# Patient Record
Sex: MALE | Race: WHITE | URBAN - METROPOLITAN AREA
[De-identification: names, ages, dates, MRNs, and addresses within clinical notes are randomized per-mention and may not be internally consistent; named-entity substitution may affect disease eponyms.]

---

## 2020-09-17 ENCOUNTER — EMERGENCY (EMERGENCY)
Facility: HOSPITAL | Age: 30
LOS: 1 days | Discharge: HOME | End: 2020-09-17
Attending: EMERGENCY MEDICINE | Admitting: EMERGENCY MEDICINE
Payer: COMMERCIAL

## 2020-09-17 VITALS
WEIGHT: 195.11 LBS | HEART RATE: 113 BPM | DIASTOLIC BLOOD PRESSURE: 85 MMHG | TEMPERATURE: 100 F | OXYGEN SATURATION: 97 % | SYSTOLIC BLOOD PRESSURE: 135 MMHG | RESPIRATION RATE: 20 BRPM | HEIGHT: 73 IN

## 2020-09-17 DIAGNOSIS — L03.114 CELLULITIS OF LEFT UPPER LIMB: ICD-10-CM

## 2020-09-17 DIAGNOSIS — M79.632 PAIN IN LEFT FOREARM: ICD-10-CM

## 2020-09-17 PROCEDURE — 73201 CT UPPER EXTREMITY W/DYE: CPT | Mod: 26,LT

## 2020-09-17 PROCEDURE — 73070 X-RAY EXAM OF ELBOW: CPT | Mod: 26,LT

## 2020-09-17 PROCEDURE — 93010 ELECTROCARDIOGRAM REPORT: CPT

## 2020-09-17 PROCEDURE — 73090 X-RAY EXAM OF FOREARM: CPT | Mod: 26,LT

## 2020-09-17 PROCEDURE — 99285 EMERGENCY DEPT VISIT HI MDM: CPT

## 2020-09-17 NOTE — ED ADULT NURSE NOTE - OBJECTIVE STATEMENT
Pt walked in c/o swelling and pain to L upper elbow as per noted a scab a few days ago and picked at it this AM noted the swelling and redness, went to UC , sent to ED for eval. Swelling and redness noted to site with small scab in the middle, temp at triage - sepsis protocol initated, in no acute distress, 18IVg to RAC , blood drawn and sent to lab. colt sood

## 2020-09-17 NOTE — ED PROVIDER NOTE - ATTENDING CONTRIBUTION TO CARE
Patient presenting with L arm cellulitis- may have started with an insect bite that he scratched. No hx of other SSTIs. No f/c. VSS. l ulnar side forearm quite swollen, red and indurated. No pain with ROM of muscles or elbow. CT shows just cellulitis. Got IV CTX and vanco. Did well d/c on PO abx with arm elevation and fu here.

## 2020-09-17 NOTE — ED PROVIDER NOTE - PATIENT PORTAL LINK FT
You can access the FollowMyHealth Patient Portal offered by [facility] by registering at the following website: [email or web address]. By joining [employer]’s FollowMyHealth portal, you will also be able to view your health information using other applications (apps) compatible with our system.

## 2020-09-17 NOTE — ED PROVIDER NOTE - PROGRESS NOTE DETAILS
PGY3/MD David. CT with no abscess or gas collection. Normal WBC and lactate. VCM and CTRX were given 2hrs ago, the skin erythematous got better, shrink, pt denies any fever, chills. APx4, pt understands the plan. any of the worsening signs or concerning, pt needs to come back to the ED tomorrow in the morning, but if any question, pt can call back to the ED. Dr. Paz's buisiness card has been provided. I have given the pt strict return and follow up precautions. The patient has been provided with a copy of all pertinent results. The patient has been informed of all concerning signs and symptoms to return to Emergency Department, the necessity to follow up with PMD/Clinic/follow up provided within 2-3 days was explained, and the patient reports understanding of above with capacity and insight.

## 2020-09-17 NOTE — ED PROVIDER NOTE - NSFOLLOWUPINSTRUCTIONS_ED_ALL_ED_FT
Cellulitis    Cellulitis is a skin infection caused by bacteria. This condition occurs most often in the arms and lower legs but can occur anywhere over the body. Symptoms include redness, swelling, warm skin, tenderness, and chills/fever. If you were prescribed an antibiotic medicine, take it as told by your health care provider. Do not stop taking the antibiotic even if you start to feel better.    SEEK IMMEDIATE MEDICAL CARE IF YOU HAVE ANY OF THE FOLLOWING SYMPTOMS: worsening fever, red streaks coming from affected area, vomiting or diarrhea, or dizziness/lightheadedness.    Please take Bactrim 2 T twice/day

## 2020-09-17 NOTE — ED ADULT NURSE NOTE - CHIEF COMPLAINT QUOTE
[facility], swelling and pain to left forearm, noted a few days ago. unknown cause. pt appears anxious and trembling in triage. states he takes adderall and zoloft.

## 2020-09-17 NOTE — ED PROVIDER NOTE - CLINICAL SUMMARY MEDICAL DECISION MAKING FREE TEXT BOX
PGY3/MD David. 29 yo M with no immuno compromise, p/w left arm redness, likely cellulitis not flucutence or pus drainarge, no limited ROM so not likely septic joint, but will get xray to r/o gas collction, given empirical evidence, likely MRSA or staphylo coccal infection, tachycardia, if pt fits sepsis criteria, iv abx will be given immediately but otherwise, trustable follow up, can be treated with oral abx with close PCP follow up. PGY3/MD David. 31 yo M with no immuno compromise, p/w left arm redness, likely cellulitis given no fluctuance or pus drainarge, but will do ct to r/o subcutaneous or intramuscular fluid collection, given empirical evidence, can be MRSA will give iv VCM, tachycardia, but not febrile or not toxic, not a septic appearance, with the result of CT, dispo accordingly

## 2020-09-17 NOTE — ED PROVIDER NOTE - PHYSICAL EXAMINATION
PGY3/MD David.   VITALS: reviewed  GEN: No apparent distress, A & O x 4  HEAD/EYES: NC/AT, anicteric sclerae, no conjunctival pallor  ENT: mucus membranes moist, oropharynx WNL, trachea midline, no JVD, neck is supple  RESP: lungs CTA with equal breath sounds bilaterally, chest wall nontender and atraumatic  CV: tachycardia; distal pulses intact and symmetric bilaterally  ABDOMEN: normoactive bowel sounds, soft, nondistended, nontender  MSK: extremities atraumatic and nontender, no edema, no asymmetry.   SKIN: warm, dry, no rash, no bruising, no cyanosis. color appropriate for ethnicity + 10 cm redness with tenderness over the lateral fore arm, close to joint but no limited ROM of the left lebow flexion, no effusion or flucutuence is appreciated, skin break on the top  NEURO: alert, mentating appropriately, no facial asymmetry. gross sensation, motor, coordination are intact  PSYCH: Affect appropriate

## 2020-09-17 NOTE — ED PROVIDER NOTE - OBJECTIVE STATEMENT
PGY3/MD David. 29 yo M with anxiety, p/w left fore arm redness and pain, sent from [facility] for cellulitis. Pt noted it started last Sunday (5d ago), minimum pain but progressively worsening, now 8/10. Did not take any medications, denies fever but feels chills. Denies trauma or long travel or hiking, noted that he might have "pinpul" at the beginning. No COVID contact or test positive this year, tetanus or STD creening is upto date. PGY3/MD David. 31 yo M with anxiety, p/w left fore arm redness and pain, sent from [facility] for cellulitis. Pt noted it started last Sunday (5d ago), minimum pain but progressively worsening, now 8/10. Did not take any medications, denies fever but feels chills. Denies trauma or long travel or hiking, noted that he might have "pinpul" at the beginning. No COVID contact or test positive this year, tetanus or STD screening including HIV is upto date.